# Patient Record
Sex: FEMALE | Race: WHITE | Employment: OTHER | URBAN - METROPOLITAN AREA
[De-identification: names, ages, dates, MRNs, and addresses within clinical notes are randomized per-mention and may not be internally consistent; named-entity substitution may affect disease eponyms.]

---

## 2024-03-26 ENCOUNTER — HOSPITAL ENCOUNTER (EMERGENCY)
Age: 69
Discharge: HOME | End: 2024-03-26
Payer: MEDICARE

## 2024-03-26 VITALS
HEIGHT: 64 IN | WEIGHT: 119.1 LBS | TEMPERATURE: 98 F | SYSTOLIC BLOOD PRESSURE: 143 MMHG | HEART RATE: 67 BPM | BODY MASS INDEX: 20.33 KG/M2 | OXYGEN SATURATION: 96 % | DIASTOLIC BLOOD PRESSURE: 49 MMHG | RESPIRATION RATE: 14 BRPM

## 2024-03-26 DIAGNOSIS — L98.9 SKIN LESION OF FACE: Primary | ICD-10-CM

## 2024-03-26 PROCEDURE — 99283 EMERGENCY DEPT VISIT LOW MDM: CPT

## 2024-03-26 NOTE — ED PROVIDER NOTES
REFERRED TO:  [facility] MOHS Surgery  [address]  [address]  [facility] [address]  [phone]        [facility]  [address]  [address]  [facility] [address]  [phone]        Adenike Orlando  [phone] [address]  [facility] [address]  Schedule an appointment as soon as possible for a visit in 2 days        DISCHARGE MEDICATIONS:  There are no discharge medications for this patient.      DISCONTINUED MEDICATIONS:  There are no discharge medications for this patient.             (Please note that portions of this note were completed with a voice recognition program.  Efforts were made to edit the dictations but occasionally words are mis-transcribed.)    RADHA Diggs CNP (electronically signed)       Karan Aguirre APRN - CNP  03/26/24 2658

## 2024-03-26 NOTE — DISCHARGE INSTRUCTIONS
Call the [facility] office above (Dr Adenike Orlando)  and give them your insurance information.   Take the first available appointment they offer and then tell the  you were referred by ER.  Tell them Adenike Orlando talked to the er and said you would get in sooner. They will call you back and reschedule to get in ASAP.

## 2024-03-26 NOTE — ED TRIAGE NOTES
68y/o female presents to the ED with skin irritation. Pt states she has a hx of melanoma that was removed. Pt states this spot developed 10 days ago and progressively worse. No drainage. +itching and some pain/tenderness. -n/v/f/c/d.

## 2024-04-02 ENCOUNTER — TELEPHONE (OUTPATIENT)
Dept: SURGERY | Age: 69
End: 2024-04-02

## 2024-04-02 NOTE — TELEPHONE ENCOUNTER
From: Julia Fernández   Sent: Tuesday, April 2, 2024 8:01 AM  To: [email or web address]  Subject: Re GENERAL DERMATOLOGIST LIST -    Dear Ms. Andujar,     We received a fax from an emergency room, indicating you need a general dermatologist. Unfortunately, Dr. Ladonna Sullivan treats pts for known skin cancers that have been pathologically proven.  Please look at the list below to see if you can schedule an appt with one of these clinicians that we are recommending to patient's in need of a dermatologist.     Thank you,   Julia Fernández   Surgery Scheduler       The first 2 are new and building their practice.      MD Andi [facility]  [address]  [place] [address]  Ph. [phone]            Rosette Angela MD       [facility]          AESTHETICS       [address] [place] [address]       Ph. [phone]           Dr. Charline Licea  [facility]   [address] [place] [address]  Ph. [phone]  Fx: [phone]     [facility]  [address] [place] [address]                   Ph. [phone]          [facility]        [address]        [place] [address].        Ph: [phone]     [facility]  Cortney Sullivan DO  [address]  [place], [place] [id number] [phone]

## 2024-12-19 ENCOUNTER — HOSPITAL ENCOUNTER (EMERGENCY)
Age: 69
Discharge: HOME | End: 2024-12-19
Attending: EMERGENCY MEDICINE
Payer: MEDICARE

## 2024-12-19 ENCOUNTER — APPOINTMENT (OUTPATIENT)
Dept: CT IMAGING | Age: 69
End: 2024-12-19
Payer: MEDICARE

## 2024-12-19 VITALS
TEMPERATURE: 98.1 F | HEIGHT: 64 IN | BODY MASS INDEX: 20.09 KG/M2 | OXYGEN SATURATION: 98 % | WEIGHT: 117.7 LBS | DIASTOLIC BLOOD PRESSURE: 77 MMHG | HEART RATE: 63 BPM | RESPIRATION RATE: 14 BRPM | SYSTOLIC BLOOD PRESSURE: 145 MMHG

## 2024-12-19 DIAGNOSIS — G50.0 TRIGEMINAL NEURALGIA OF RIGHT SIDE OF FACE: ICD-10-CM

## 2024-12-19 DIAGNOSIS — G51.8 CRANIOFACIAL PAIN SYNDROME: Primary | ICD-10-CM

## 2024-12-19 LAB
ALBUMIN SERPL-MCNC: 4.4 G/DL (ref 3.4–5)
ALBUMIN/GLOB SERPL: 1.8 {RATIO} (ref 1.1–2.2)
ALP SERPL-CCNC: 87 U/L (ref 40–129)
ALT SERPL-CCNC: 18 U/L (ref 10–40)
ANION GAP SERPL CALCULATED.3IONS-SCNC: 14 MMOL/L (ref 3–16)
AST SERPL-CCNC: 18 U/L (ref 15–37)
BASOPHILS # BLD: 0.1 K/UL (ref 0–0.2)
BASOPHILS NFR BLD: 1.1 %
BILIRUB SERPL-MCNC: 0.3 MG/DL (ref 0–1)
BUN SERPL-MCNC: 18 MG/DL (ref 7–20)
CALCIUM SERPL-MCNC: 9.3 MG/DL (ref 8.3–10.6)
CHLORIDE SERPL-SCNC: 102 MMOL/L (ref 99–110)
CO2 SERPL-SCNC: 26 MMOL/L (ref 21–32)
CREAT SERPL-MCNC: 0.8 MG/DL (ref 0.6–1.2)
DEPRECATED RDW RBC AUTO: 13.5 % (ref 12.4–15.4)
EOSINOPHIL # BLD: 0.2 K/UL (ref 0–0.6)
EOSINOPHIL NFR BLD: 2.8 %
ERYTHROCYTE [SEDIMENTATION RATE] IN BLOOD BY WESTERGREN METHOD: 18 MM/HR (ref 0–30)
GFR SERPLBLD CREATININE-BSD FMLA CKD-EPI: 78 ML/MIN/{1.73_M2}
GLUCOSE SERPL-MCNC: 106 MG/DL (ref 70–99)
HCT VFR BLD AUTO: 41.2 % (ref 36–48)
HGB BLD-MCNC: 14.2 G/DL (ref 12–16)
LYMPHOCYTES # BLD: 3.1 K/UL (ref 1–5.1)
LYMPHOCYTES NFR BLD: 35.3 %
MCH RBC QN AUTO: 33.1 PG (ref 26–34)
MCHC RBC AUTO-ENTMCNC: 34.4 G/DL (ref 31–36)
MCV RBC AUTO: 96 FL (ref 80–100)
MONOCYTES # BLD: 0.7 K/UL (ref 0–1.3)
MONOCYTES NFR BLD: 8.4 %
NEUTROPHILS # BLD: 4.6 K/UL (ref 1.7–7.7)
NEUTROPHILS NFR BLD: 52.4 %
PLATELET # BLD AUTO: 365 K/UL (ref 135–450)
PMV BLD AUTO: 9.5 FL (ref 5–10.5)
POTASSIUM SERPL-SCNC: 4 MMOL/L (ref 3.5–5.1)
PROT SERPL-MCNC: 6.9 G/DL (ref 6.4–8.2)
RBC # BLD AUTO: 4.29 M/UL (ref 4–5.2)
SODIUM SERPL-SCNC: 142 MMOL/L (ref 136–145)
WBC # BLD AUTO: 8.8 K/UL (ref 4–11)

## 2024-12-19 PROCEDURE — 6370000000 HC RX 637 (ALT 250 FOR IP): Performed by: EMERGENCY MEDICINE

## 2024-12-19 PROCEDURE — 70450 CT HEAD/BRAIN W/O DYE: CPT

## 2024-12-19 PROCEDURE — 85652 RBC SED RATE AUTOMATED: CPT

## 2024-12-19 PROCEDURE — 99285 EMERGENCY DEPT VISIT HI MDM: CPT

## 2024-12-19 PROCEDURE — 85025 COMPLETE CBC W/AUTO DIFF WBC: CPT

## 2024-12-19 PROCEDURE — 96374 THER/PROPH/DIAG INJ IV PUSH: CPT

## 2024-12-19 PROCEDURE — 6360000002 HC RX W HCPCS: Performed by: EMERGENCY MEDICINE

## 2024-12-19 PROCEDURE — 96372 THER/PROPH/DIAG INJ SC/IM: CPT

## 2024-12-19 PROCEDURE — 6360000004 HC RX CONTRAST MEDICATION: Performed by: EMERGENCY MEDICINE

## 2024-12-19 PROCEDURE — 80053 COMPREHEN METABOLIC PANEL: CPT

## 2024-12-19 PROCEDURE — 70498 CT ANGIOGRAPHY NECK: CPT

## 2024-12-19 RX ORDER — AMLODIPINE BESYLATE 5 MG/1
5 TABLET ORAL DAILY
COMMUNITY
Start: 2024-12-09

## 2024-12-19 RX ORDER — CARBAMAZEPINE 100 MG/1
100 TABLET, EXTENDED RELEASE ORAL 2 TIMES DAILY
Qty: 20 TABLET | Refills: 0 | Status: SHIPPED | OUTPATIENT
Start: 2024-12-19 | End: 2024-12-29

## 2024-12-19 RX ORDER — SUMATRIPTAN SUCCINATE 25 MG/1
25 TABLET ORAL
Qty: 10 TABLET | Refills: 0 | Status: SHIPPED | OUTPATIENT
Start: 2024-12-19 | End: 2024-12-19

## 2024-12-19 RX ORDER — IOPAMIDOL 755 MG/ML
75 INJECTION, SOLUTION INTRAVASCULAR
Status: COMPLETED | OUTPATIENT
Start: 2024-12-19 | End: 2024-12-19

## 2024-12-19 RX ORDER — KETOROLAC TROMETHAMINE 30 MG/ML
30 INJECTION, SOLUTION INTRAMUSCULAR; INTRAVENOUS ONCE
Status: COMPLETED | OUTPATIENT
Start: 2024-12-19 | End: 2024-12-19

## 2024-12-19 RX ORDER — ASPIRIN 81 MG/1
81 TABLET ORAL DAILY
COMMUNITY

## 2024-12-19 RX ORDER — SUMATRIPTAN 6 MG/.5ML
6 INJECTION, SOLUTION SUBCUTANEOUS ONCE
Status: COMPLETED | OUTPATIENT
Start: 2024-12-19 | End: 2024-12-19

## 2024-12-19 RX ADMIN — KETOROLAC TROMETHAMINE 30 MG: 30 INJECTION INTRAMUSCULAR; INTRAVENOUS at 09:01

## 2024-12-19 RX ADMIN — SUMATRIPTAN 6 MG: 6 INJECTION, SOLUTION SUBCUTANEOUS at 10:06

## 2024-12-19 RX ADMIN — IOPAMIDOL 75 ML: 755 INJECTION, SOLUTION INTRAVENOUS at 09:46

## 2024-12-19 ASSESSMENT — ENCOUNTER SYMPTOMS
BACK PAIN: 0
SHORTNESS OF BREATH: 0
BLOOD IN STOOL: 0
RECTAL PAIN: 0
SORE THROAT: 0
ABDOMINAL PAIN: 0
EYE PAIN: 0
TROUBLE SWALLOWING: 0
SINUS PRESSURE: 0
VOICE CHANGE: 0
DIARRHEA: 0
EYE REDNESS: 0
WHEEZING: 0
COLOR CHANGE: 0
PHOTOPHOBIA: 0
EYE DISCHARGE: 0
ABDOMINAL DISTENTION: 0
CONSTIPATION: 0
RHINORRHEA: 0
NAUSEA: 0
STRIDOR: 0
COUGH: 0
VOMITING: 0
APNEA: 0
CHEST TIGHTNESS: 0

## 2024-12-19 ASSESSMENT — PAIN - FUNCTIONAL ASSESSMENT: PAIN_FUNCTIONAL_ASSESSMENT: NONE - DENIES PAIN

## 2024-12-19 NOTE — DISCHARGE INSTRUCTIONS
Take Carbamazepine  mg twice daily. This medication should be followed closely by your primary care provider.   Take Sumatriptan 50 mg daily.   Keep your January appointment with Dr. Aden Mix as previously scheduled.   Return if symptoms change or worsen.

## 2024-12-19 NOTE — ED PROVIDER NOTES
Hannah Andujar is a 69 year old female who presents to the ED for evaluation of right sided head pain. Since November 1st she has been experiencing intermittent sharp pain that begins around the right zygoma area and \"shoots\" toward the middle of her forehead, but does not cross the midline. Pain occurs every 2-6 minutes, and it lasts between 5 and 15 seconds normally. She has seen her PCP, who has prescribed a number of medications (Gabapentin, Naproxen, Amitryptiline, Tramadol, and Lamotrigene. She discontinued all of these because they didn't help her symptoms. She was on a five day course of Prednisone a few weeks ago for \"something unrelated\" and she does not feel that it affected her symptoms positively or negatively. She denies headache, dizziness, or vision changes. She was referred to a neurologist, Dr. Aden Mix, and her appointment is January 2nd. She has some nausea with out emesis. She has been unable to sleep for the last two days due to the frequency of her \"spasms\". Her NIH stroke score is 0.     BP (!) 145/77   Pulse 63   Temp 98.1 °F (36.7 °C) (Oral)   Resp 14   Ht 1.626 m (5' 4\")   Wt 53.4 kg (117 lb 11.2 oz)   SpO2 98%   BMI 20.20 kg/m²     I have reviewed the following from the nursing documentation:      Prior to Admission medications    Medication Sig Start Date End Date Taking? Authorizing Provider   amLODIPine (NORVASC) 5 MG tablet Take 1 tablet by mouth daily 12/9/24  Yes Paty Case MD   aspirin 81 MG EC tablet Take 1 tablet by mouth daily    Paty Case MD   Acetaminophen 325 MG CAPS Take 325 mg by mouth as needed    Paty Case MD       Allergies as of 12/19/2024    (No Known Allergies)       Past Medical History:   Diagnosis Date    Hypertension     Melanoma (HCC)         Surgical History: History reviewed. No pertinent surgical history.     Family History:  History reviewed. No pertinent family history.    Social History     Socioeconomic History

## 2025-04-04 RX ORDER — CALCIUM CARBONATE/VITAMIN D3 600 MG-10
1 TABLET ORAL DAILY
COMMUNITY

## 2025-04-08 ENCOUNTER — HOSPITAL ENCOUNTER (OUTPATIENT)
Dept: RADIATION ONCOLOGY | Age: 70
Discharge: HOME | End: 2025-04-08
Payer: MEDICARE

## 2025-04-08 ENCOUNTER — ANESTHESIA (OUTPATIENT)
Dept: RADIATION ONCOLOGY | Age: 70
End: 2025-04-08
Payer: MEDICARE

## 2025-04-08 ENCOUNTER — ANESTHESIA EVENT (OUTPATIENT)
Dept: RADIATION ONCOLOGY | Age: 70
End: 2025-04-08
Payer: MEDICARE

## 2025-04-08 ENCOUNTER — HOSPITAL ENCOUNTER (OUTPATIENT)
Dept: MRI IMAGING | Age: 70
Discharge: HOME | End: 2025-04-08
Payer: MEDICARE

## 2025-04-08 VITALS
TEMPERATURE: 97.3 F | WEIGHT: 116 LBS | HEART RATE: 70 BPM | SYSTOLIC BLOOD PRESSURE: 128 MMHG | OXYGEN SATURATION: 93 % | RESPIRATION RATE: 18 BRPM | BODY MASS INDEX: 19.81 KG/M2 | HEIGHT: 64 IN | DIASTOLIC BLOOD PRESSURE: 65 MMHG

## 2025-04-08 DIAGNOSIS — G50.0 TRIGEMINAL NEURALGIA: ICD-10-CM

## 2025-04-08 DIAGNOSIS — C79.31 METASTASIS TO BRAIN (HCC): ICD-10-CM

## 2025-04-08 PROCEDURE — 7100000010 HC PHASE II RECOVERY - FIRST 15 MIN

## 2025-04-08 PROCEDURE — 7100000011 HC PHASE II RECOVERY - ADDTL 15 MIN

## 2025-04-08 PROCEDURE — 6360000002 HC RX W HCPCS: Performed by: NEUROLOGICAL SURGERY

## 2025-04-08 PROCEDURE — 6370000000 HC RX 637 (ALT 250 FOR IP): Performed by: NEUROLOGICAL SURGERY

## 2025-04-08 PROCEDURE — A9576 INJ PROHANCE MULTIPACK: HCPCS | Performed by: NEUROLOGICAL SURGERY

## 2025-04-08 PROCEDURE — 6360000002 HC RX W HCPCS

## 2025-04-08 PROCEDURE — 77334 RADIATION TREATMENT AID(S): CPT

## 2025-04-08 PROCEDURE — 77300 RADIATION THERAPY DOSE PLAN: CPT

## 2025-04-08 PROCEDURE — 70553 MRI BRAIN STEM W/O & W/DYE: CPT

## 2025-04-08 PROCEDURE — 77295 3-D RADIOTHERAPY PLAN: CPT

## 2025-04-08 PROCEDURE — 2500000003 HC RX 250 WO HCPCS: Performed by: NEUROLOGICAL SURGERY

## 2025-04-08 PROCEDURE — 2580000003 HC RX 258

## 2025-04-08 PROCEDURE — 3700000001 HC ADD 15 MINUTES (ANESTHESIA): Performed by: ANESTHESIOLOGY

## 2025-04-08 PROCEDURE — 77371 SRS MULTISOURCE: CPT

## 2025-04-08 PROCEDURE — 6360000004 HC RX CONTRAST MEDICATION: Performed by: NEUROLOGICAL SURGERY

## 2025-04-08 PROCEDURE — 3700000000 HC ANESTHESIA ATTENDED CARE: Performed by: ANESTHESIOLOGY

## 2025-04-08 RX ORDER — SODIUM CHLORIDE 9 MG/ML
INJECTION, SOLUTION INTRAMUSCULAR; INTRAVENOUS; SUBCUTANEOUS
Status: DISCONTINUED | OUTPATIENT
Start: 2025-04-08 | End: 2025-04-08 | Stop reason: SDUPTHER

## 2025-04-08 RX ORDER — LIDOCAINE HYDROCHLORIDE 20 MG/ML
INJECTION, SOLUTION INTRAVENOUS
Status: DISCONTINUED | OUTPATIENT
Start: 2025-04-08 | End: 2025-04-08 | Stop reason: SDUPTHER

## 2025-04-08 RX ORDER — PROPOFOL 10 MG/ML
INJECTION, EMULSION INTRAVENOUS
Status: DISCONTINUED | OUTPATIENT
Start: 2025-04-08 | End: 2025-04-08 | Stop reason: SDUPTHER

## 2025-04-08 RX ORDER — NEOMYCIN/BACITRACIN/POLYMYXINB 3.5-400-5K
OINTMENT (GRAM) TOPICAL 2 TIMES DAILY
Status: DISCONTINUED | OUTPATIENT
Start: 2025-04-08 | End: 2025-04-09 | Stop reason: HOSPADM

## 2025-04-08 RX ORDER — SODIUM BICARBONATE 42 MG/ML
1.5 INJECTION, SOLUTION INTRAVENOUS ONCE
Status: COMPLETED | OUTPATIENT
Start: 2025-04-08 | End: 2025-04-08

## 2025-04-08 RX ORDER — ACETAMINOPHEN 325 MG/1
650 TABLET ORAL EVERY 4 HOURS PRN
Status: DISCONTINUED | OUTPATIENT
Start: 2025-04-08 | End: 2025-04-09 | Stop reason: HOSPADM

## 2025-04-08 RX ORDER — 0.9 % SODIUM CHLORIDE 0.9 %
500 INTRAVENOUS SOLUTION INTRAVENOUS ONCE
Status: DISCONTINUED | OUTPATIENT
Start: 2025-04-08 | End: 2025-04-09 | Stop reason: HOSPADM

## 2025-04-08 RX ORDER — ONDANSETRON 2 MG/ML
4 INJECTION INTRAMUSCULAR; INTRAVENOUS EVERY 6 HOURS PRN
Status: DISCONTINUED | OUTPATIENT
Start: 2025-04-08 | End: 2025-04-09 | Stop reason: HOSPADM

## 2025-04-08 RX ORDER — DEXAMETHASONE SODIUM PHOSPHATE 4 MG/ML
4 INJECTION, SOLUTION INTRA-ARTICULAR; INTRALESIONAL; INTRAMUSCULAR; INTRAVENOUS; SOFT TISSUE ONCE
Status: COMPLETED | OUTPATIENT
Start: 2025-04-08 | End: 2025-04-08

## 2025-04-08 RX ORDER — BUPIVACAINE HYDROCHLORIDE 5 MG/ML
30 INJECTION, SOLUTION EPIDURAL; INTRACAUDAL; PERINEURAL ONCE
Status: COMPLETED | OUTPATIENT
Start: 2025-04-08 | End: 2025-04-08

## 2025-04-08 RX ADMIN — DEXAMETHASONE SODIUM PHOSPHATE 4 MG: 4 INJECTION INTRA-ARTICULAR; INTRALESIONAL; INTRAMUSCULAR; INTRAVENOUS; SOFT TISSUE at 09:30

## 2025-04-08 RX ADMIN — ONDANSETRON 4 MG: 2 INJECTION, SOLUTION INTRAMUSCULAR; INTRAVENOUS at 08:59

## 2025-04-08 RX ADMIN — BACITRACIN ZINC, NEOMYCIN, POLYMYXIN B SULFAT: 5000; 3.5; 4 OINTMENT TOPICAL at 13:16

## 2025-04-08 RX ADMIN — PROPOFOL 30 MG: 10 INJECTION, EMULSION INTRAVENOUS at 09:29

## 2025-04-08 RX ADMIN — PROPOFOL 20 MG: 10 INJECTION, EMULSION INTRAVENOUS at 09:35

## 2025-04-08 RX ADMIN — ACETAMINOPHEN 650 MG: 325 TABLET ORAL at 11:17

## 2025-04-08 RX ADMIN — LIDOCAINE HYDROCHLORIDE 40 MG: 20 INJECTION, SOLUTION INTRAVENOUS at 09:29

## 2025-04-08 RX ADMIN — LIDOCAINE HYDROCHLORIDE 30 ML: 10 INJECTION, SOLUTION EPIDURAL; INFILTRATION; INTRACAUDAL; PERINEURAL at 09:30

## 2025-04-08 RX ADMIN — PROPOFOL 10 MG: 10 INJECTION, EMULSION INTRAVENOUS at 09:39

## 2025-04-08 RX ADMIN — GADOTERIDOL 11 ML: 279.3 INJECTION, SOLUTION INTRAVENOUS at 07:56

## 2025-04-08 RX ADMIN — PROPOFOL 20 MG: 10 INJECTION, EMULSION INTRAVENOUS at 09:32

## 2025-04-08 RX ADMIN — SODIUM BICARBONATE 1.5 MEQ: 42 INJECTION, SOLUTION INTRAVENOUS at 09:30

## 2025-04-08 RX ADMIN — SODIUM CHLORIDE 50 ML: 9 INJECTION, SOLUTION INTRAMUSCULAR; INTRAVENOUS; SUBCUTANEOUS at 09:42

## 2025-04-08 RX ADMIN — BUPIVACAINE HYDROCHLORIDE 150 MG: 5 INJECTION, SOLUTION EPIDURAL; INTRACAUDAL; PERINEURAL at 09:30

## 2025-04-08 RX ADMIN — PROPOFOL 10 MG: 10 INJECTION, EMULSION INTRAVENOUS at 09:41

## 2025-04-08 ASSESSMENT — PAIN DESCRIPTION - DESCRIPTORS: DESCRIPTORS: PRESSURE

## 2025-04-08 ASSESSMENT — PAIN SCALES - GENERAL: PAINLEVEL_OUTOF10: 5

## 2025-04-08 ASSESSMENT — LIFESTYLE VARIABLES: SMOKING_STATUS: 1

## 2025-04-08 NOTE — ANESTHESIA POSTPROCEDURE EVALUATION
Department of Anesthesiology  Postprocedure Note    Patient: Hannah Andujar  MRN: [id number]  YOB: 1955  Date of evaluation: 4/8/2025    Procedure Summary       Date: 04/08/25 Room / Location: [facility] Gamma Knife    Anesthesia Start: 0927 Anesthesia Stop: 0946    Procedure: GAMMAKNIFE W ANESTHESIA Diagnosis:     Scheduled Providers: Sondra Guardado DO Responsible Provider: Sondra Guardado DO    Anesthesia Type: MAC ASA Status: 2            Anesthesia Type: No value filed.    Tahmina Phase I: Tahmina Score: 10    Tahmina Phase II:      Anesthesia Post Evaluation    Patient location during evaluation: PACU  Patient participation: complete - patient participated  Level of consciousness: awake and alert  Airway patency: patent  Nausea & Vomiting: no vomiting and no nausea  Cardiovascular status: blood pressure returned to baseline  Respiratory status: acceptable  Hydration status: euvolemic  Pain management: adequate    No notable events documented.

## 2025-04-08 NOTE — PROGRESS NOTES
Gamma Knife Frame Placement Time Out     1.  Patient states name and birthdate correctly? Yes    2. Procedure listed on consent:  G-Frame placement and Gamma Knife radiosurgery for RIGHT trigeminal neuralgia    3.  Is this the correct procedure?  Yes    4.  Are the consents signed?  Yes    5. Is this a trigeminal neuralgia case? Yes    -If yes, what side are we treating? right    -If yes, is the side marked for laterality?  yes    6.  Have films been reviewed today?  Yes    7.  Has the interim History and Physical form been completed? yes    8.  Has the neurosurgeon reviewed the Gamma Knife RN Pre-Procedure Checklist?  Yes    9.  FEMALES ONLY: Does the patient require a pregnancy test per [facility] policy? no     -If yes, the result was:  na    10.  Are all present in agreement?  Yes    Those present for time out:  Dr. Baker, Dr. Sweeney, Tomeka Haro, RN, Galo Espinoza, CRNA

## 2025-04-08 NOTE — PROGRESS NOTES
Patient arrived to Gamma Knife department alert and oriented x 4.   Patient is neurologically intact.   PIV established without difficulty.  Initial vitals WNL, and patient denies pain.    KPS: 90    ECO    mFI-5: 1    Tomeka Haro RN

## 2025-04-08 NOTE — H&P
There have been no changes in the patient's condition since the history and physical.    Hugo Baker MD, PhD  [place] [address], [address]  [place], [place], [address] [phone] (c), [phone] (o)

## 2025-04-08 NOTE — PROGRESS NOTES
Gamma Knife RN Pre-Procedure Checklist     1. Has the patient ever had any surgery on the head or neck?  No    -If yes, is the surgery site marked?  N/A    2. Has the patient ever received radiation treatment to the head or neck? No      -If yes, is the treatment summary and/or disk of treatment plan available? N/A      -If the patient has had previous Gamma Knife radiosurgery has the most recent imaging been reviewed in the Gamma Plan? N/A      3. Has the patient received chemotherapy or immunotherapy in the past month? No      -If yes, list the agent and date of last treatment.  N/A    5. List the procedure-specific medications taken by the patient this morning:   -Carbamazepine 300 mg    6. What is the patient’s GFR? 97    -For GFR 0-30 => no contrast at MRI    -For GFR 31-59 => single dose contrast at MRI    -For GFR >60 => double dose contrast at MRI    7. IF FEMALE: Does the patient require a pregnancy test per [facility] policy?   no   -If yes, the result is: na    8. What is the patient's baseline CO2? 33     Tomeka Haro RN

## 2025-04-08 NOTE — PROGRESS NOTES
Patient received MAC under the supervision of Dr. Guardado.  This RN was present throughout MAC and assumed care from anesthesia for Phase II recovery.  The patient is awake and breathing easily.  Patient denies pain.     See Flow Sheet for vitals and Tahmina Score.    Tomeka Haro RN

## 2025-04-08 NOTE — PROGRESS NOTES
After Gamma Knife procedure, the G-frame was removed by ARTURO Eckert and ARTURO Chou and fixation pin sites were observed for bleeding. None was noted.  Pin sites were cleaned with alcohol and povidone-iodine.  Dr. Sweeney then placed sutures at all four pin sites.     Patient met Phase II discharge criteria.  Baseline neurological status unchanged.  See discharge Tahmina score and vitals.    Discharge instructions were reviewed with patient and friend who verbalized understanding using teach back method. A written copy of the instructions was given. Patient has follow up appointments scheduled.    Patient was accompanied to transportation by this RN.    Tomeka Haro RN

## 2025-04-08 NOTE — OP NOTE
THE [facility]  OPERATIVE REPORT    PATIENT NAME:  Hannah Andujar  MR #:  [id number]  YOB: 1955   ACCOUNT #:  [id number]  ADMIT DATE:  4/8/2025  SURGERY DATE:  4/8/2025       PREOPERATIVE DIAGNOSIS:     1. Right trigeminal neuralgia     POSTOPERATIVE DIAGNOSIS:     1. Right trigeminal neuralgia    PROCEDURE(S) PERFORMED:    1. Placement of stereotactic head frame   2. Gamma Knife radiosurgery for right trigeminal neuralgia    NEUROSURGEON: Hugo Baker MD, PhD; Trenton Sweeney MD                          RADIATION ONCOLOGIST: Josselin Foley MD     ANESTHESIA: Moderate sedation    COMPLICATIONS: None    INDICATIONS: Ms. Andujar is a 70-year-old woman with right-sided facial pain and a clinical syndrome consistent with type I trigeminal neuralgia. We discussed various treatment options and ultimately recommended Gamma Knife radiosurgery. The patient was aware of the potential benefits in terms of pain relief and the possible risks including (but not limited to): pin site bleeding/swelling/infection, facial numbness, brain swelling, new neurological symptoms, and/or radiation injury the brain.    PERFORMANCE STATUS: 100%    DETAILS OF PROCEDURE: The patient underwent a planning MRI scan. The four pin sites were cleaned with Chloraprep and injected with a mixture of Lidocaine 1%, Marcaine 0.5%, and sodium bicarbonate. The stereotactic head frame was secured with appropriate length pins. The target and critical structures were contoured. An optimal treatment plan was developed by the neurosurgeon, radiation oncologist, and medical physicist. All critical structure constraints were fulfilled. The patient then underwent a cone beam CT scan in the treatment position that was fused with the stereotactic MRI scan and the plan was reviewed and modified as needed.    The patient then underwent Gamma Knife radiosurgery using the following parameters:    Target Shot Blocks Dose  Isodose  Beam Time Brainstem

## 2025-04-08 NOTE — PROGRESS NOTES
Gamma Knife Radiation Delivery Time Out    1.  Patient states name and birthdate correctly? Yes    2.  Procedure listed on consent Stereotactic Radiosurgery, Gamma Knife for RIGHT trigeminal neuralgia    3. Is this the correct procedure? Yes    4. Is this a trigeminal neuralgia case? Yes    -If yes, what side are we treating? right    -If yes, is the side marked for laterality?  yes    5.  Has the final radiologist report been reviewed? yes    6.  Does the patient require Keppra prior to treatment delivery? N/A    7.  Does the patient require Ativan prior to treatment delivery?  no    8.  Are all present in agreement?  Yes    9. Those present for time out:  Dilan Suarez, Tomeka Haro, ARTURO, Dr. Og Haro, RN

## 2025-04-08 NOTE — ANESTHESIA PRE PROCEDURE
Department of Anesthesiology  Preprocedure Note       Name:  Hannah Andujar   Age:  70 y.o.  :  1955                                          MRN:  [id number]         Date:  2025      Surgeon: Patel    Procedure: gamma knife    Medications prior to admission:   Prior to Admission medications    Medication Sig Start Date End Date Taking? Authorizing Provider   calcium carb-cholecalciferol (CALCIUM + VITAMIN D3) 600-10 MG-MCG TABS per tab Take 1 tablet by mouth daily   Yes Paty Case MD   amLODIPine (NORVASC) 5 MG tablet Take 1 tablet by mouth daily 24  Yes ProviderPaty MD   aspirin 81 MG EC tablet Take 1 tablet by mouth daily   Yes ProviderPaty MD   Acetaminophen 325 MG CAPS Take 325 mg by mouth as needed   Yes ProviderPaty MD   carBAMazepine (TEGRETOL-XR) 100 MG extended release tablet Take 1 tablet by mouth 2 times daily for 10 days  Patient taking differently: Take 3 tablets by mouth in the morning, at noon, and at bedtime 24 Yes Marci Ruby MD   SUMAtriptan (IMITREX) 25 MG tablet Take 1 tablet by mouth once as needed for Migraine Take one tablet daily as needed for craniofacial pain. Zero refills. 24  Marci Ruby MD       Current medications:    Current Outpatient Medications   Medication Sig Dispense Refill    calcium carb-cholecalciferol (CALCIUM + VITAMIN D3) 600-10 MG-MCG TABS per tab Take 1 tablet by mouth daily      amLODIPine (NORVASC) 5 MG tablet Take 1 tablet by mouth daily      aspirin 81 MG EC tablet Take 1 tablet by mouth daily      Acetaminophen 325 MG CAPS Take 325 mg by mouth as needed      carBAMazepine (TEGRETOL-XR) 100 MG extended release tablet Take 1 tablet by mouth 2 times daily for 10 days (Patient taking differently: Take 3 tablets by mouth in the morning, at noon, and at bedtime) 20 tablet 0    SUMAtriptan (IMITREX) 25 MG tablet Take 1 tablet by mouth once as needed for Migraine Take one tablet

## 2025-04-08 NOTE — DISCHARGE INSTRUCTIONS
(redness, swelling, drainage or irritation at pin sites, fever >101 F for more than 24 hours), call your physician.  Do NOT stop taking your medications.  Follow up with your physician as directed.       If you have any questions during regular business hours, call the Gamma Knife nurse at [phone].  For questions during off-business hours and on weekends, contact Dr. Sweeney's office directly at [phone].

## 2025-04-09 ENCOUNTER — POST-OP TELEPHONE (OUTPATIENT)
Dept: RADIATION ONCOLOGY | Age: 70
End: 2025-04-09

## 2025-04-09 NOTE — PROGRESS NOTES
Spoke to patient POD #1 from Gamma Knife radiosurgery.  Patient denies bleeding, swelling, headache, or fever.  Reinforced all post-procedure instructions.     Reviewed with patient that the follow-up phone call is scheduled for 4/22.     Patient verbalized understanding to call with any new neurological symptoms.     Encouraged to call with any questions or concerns.     Tomeka Haro RN

## 2025-04-10 ENCOUNTER — POST-OP TELEPHONE (OUTPATIENT)
Dept: RADIATION ONCOLOGY | Age: 70
End: 2025-04-10

## 2025-04-10 NOTE — PROGRESS NOTES
Patient called with concerns of swollen eyelids. PAtient states she used the ice pack right after her treatment, but not yesterday. This RN instructed patient to ice those areas, on for 15 min-off for 15 minutes. Pt states understanding.    Encouraged patient to call back with new or worsening symptoms.

## 2025-04-22 ENCOUNTER — TELEPHONE (OUTPATIENT)
Dept: RADIATION ONCOLOGY | Age: 70
End: 2025-04-22

## 2025-04-22 NOTE — TELEPHONE ENCOUNTER
Two week follow-up phone call with patient has been completed.   Pt denies any issues with the pin sites, states that the sutures have almost dissolved.    Pt states that over the past two weeks she has had 5-6 'electric shocks'. They happened once per day, pain level was 6-7/10 pain and the 'shocks' lasted 1-2 seconds. Pt denies pain at this moment and hasn't had any 'shocks' for 2-3 days.   3.   Has been reminded of their next appointment with Dr. Baker on 5/12/25.  4.   Any other questions or further follow up will be with Dr. Baker    Thank you for referring this patient to the Gamma Knife Department for treatment, it has been a pleasure to participate in their care.  If the patient requires future Gamma Knife procedures please contact the department directly at [phone].